# Patient Record
Sex: MALE | Race: BLACK OR AFRICAN AMERICAN | NOT HISPANIC OR LATINO | Employment: UNEMPLOYED | URBAN - METROPOLITAN AREA
[De-identification: names, ages, dates, MRNs, and addresses within clinical notes are randomized per-mention and may not be internally consistent; named-entity substitution may affect disease eponyms.]

---

## 2017-12-31 ENCOUNTER — HOSPITAL ENCOUNTER (EMERGENCY)
Facility: HOSPITAL | Age: 32
Discharge: HOME | End: 2017-12-31
Attending: EMERGENCY MEDICINE
Payer: MEDICAID

## 2017-12-31 VITALS
DIASTOLIC BLOOD PRESSURE: 61 MMHG | HEIGHT: 72 IN | BODY MASS INDEX: 23.03 KG/M2 | WEIGHT: 170 LBS | OXYGEN SATURATION: 100 % | RESPIRATION RATE: 16 BRPM | TEMPERATURE: 99 F | SYSTOLIC BLOOD PRESSURE: 119 MMHG | HEART RATE: 78 BPM

## 2017-12-31 DIAGNOSIS — L02.214 CUTANEOUS ABSCESS OF GROIN: Primary | ICD-10-CM

## 2017-12-31 PROCEDURE — 10060 I&D ABSCESS SIMPLE/SINGLE: CPT

## 2017-12-31 PROCEDURE — 25000003 PHARM REV CODE 250: Performed by: PHYSICIAN ASSISTANT

## 2017-12-31 PROCEDURE — 99283 EMERGENCY DEPT VISIT LOW MDM: CPT | Mod: 25

## 2017-12-31 RX ORDER — SULFAMETHOXAZOLE AND TRIMETHOPRIM 800; 160 MG/1; MG/1
2 TABLET ORAL
Status: COMPLETED | OUTPATIENT
Start: 2017-12-31 | End: 2017-12-31

## 2017-12-31 RX ORDER — IBUPROFEN 600 MG/1
600 TABLET ORAL
Status: COMPLETED | OUTPATIENT
Start: 2017-12-31 | End: 2017-12-31

## 2017-12-31 RX ORDER — LIDOCAINE HYDROCHLORIDE AND EPINEPHRINE 20; 10 MG/ML; UG/ML
10 INJECTION, SOLUTION INFILTRATION; PERINEURAL
Status: COMPLETED | OUTPATIENT
Start: 2017-12-31 | End: 2017-12-31

## 2017-12-31 RX ORDER — SULFAMETHOXAZOLE AND TRIMETHOPRIM 800; 160 MG/1; MG/1
1 TABLET ORAL 2 TIMES DAILY
Qty: 14 TABLET | Refills: 0 | Status: SHIPPED | OUTPATIENT
Start: 2017-12-31 | End: 2018-01-07

## 2017-12-31 RX ORDER — IBUPROFEN 600 MG/1
600 TABLET ORAL EVERY 6 HOURS PRN
Qty: 20 TABLET | Refills: 0 | Status: SHIPPED | OUTPATIENT
Start: 2017-12-31 | End: 2018-03-14

## 2017-12-31 RX ADMIN — IBUPROFEN 600 MG: 600 TABLET, FILM COATED ORAL at 02:12

## 2017-12-31 RX ADMIN — SULFAMETHOXAZOLE AND TRIMETHOPRIM 2 TABLET: 800; 160 TABLET ORAL at 02:12

## 2017-12-31 RX ADMIN — LIDOCAINE HYDROCHLORIDE,EPINEPHRINE BITARTRATE 10 ML: 20; .01 INJECTION, SOLUTION INFILTRATION; PERINEURAL at 02:12

## 2017-12-31 NOTE — ED PROVIDER NOTES
Encounter Date: 12/31/2017    SCRIBE #1 NOTE: I, Sarika Mac, am scribing for, and in the presence of,  Marni Rockwell PA-C. I have scribed the following portions of the note - Other sections scribed: HPI/ROS.       History     Chief Complaint   Patient presents with    Abscess     Right groin x3 days      CC: Abscess    HPI: 32 y.o. M with H/O of an abscess presents to the ED c/o a worsening, severely painful abscess to the R groin area x3 days. Pt reports the pain radiates to his testicle and inner thigh. Pain is exacerbated with palpation. No prior tx attempted. Pt denies fever, chills, N/V/D, abdominal pain, and any other symptoms.          The history is provided by the patient.     Review of patient's allergies indicates:  No Known Allergies  History reviewed. No pertinent past medical history.  Past Surgical History:   Procedure Laterality Date    LEG SURGERY       History reviewed. No pertinent family history.  Social History   Substance Use Topics    Smoking status: Never Smoker    Smokeless tobacco: Never Used    Alcohol use No     Review of Systems   Constitutional: Negative for chills and fever.   HENT: Negative for congestion and sore throat.    Eyes: Negative for pain.   Respiratory: Negative for cough and shortness of breath.    Cardiovascular: Negative for chest pain.   Gastrointestinal: Negative for abdominal pain, diarrhea, nausea and vomiting.   Genitourinary: Positive for testicular pain. Negative for dysuria, penile pain, penile swelling and scrotal swelling.   Musculoskeletal: Negative for back pain and neck pain.   Skin: Negative for rash.        (+) abscess to R groin area; pain radiating down to testicle and inner thigh   Neurological: Negative for headaches.       Physical Exam     Initial Vitals [12/31/17 1335]   BP Pulse Resp Temp SpO2   (!) 125/59 89 14 98.9 °F (37.2 °C) 100 %      MAP       81         Physical Exam    Vitals reviewed.  Constitutional: He appears well-developed and  well-nourished. He is not diaphoretic. No distress.   HENT:   Head: Normocephalic and atraumatic.   Right Ear: External ear normal.   Left Ear: External ear normal.   Nose: Nose normal.   Eyes: Conjunctivae are normal. No scleral icterus.   Neck: Normal range of motion. Neck supple.   Cardiovascular: Normal rate, regular rhythm, normal heart sounds and intact distal pulses.   Pulmonary/Chest: Breath sounds normal. No respiratory distress. He has no wheezes. He has no rhonchi. He has no rales. He exhibits no tenderness.   Abdominal: Soft. He exhibits no distension and no mass. There is no tenderness. There is no rebound and no guarding.   Musculoskeletal: Normal range of motion.   Neurological: He is alert and oriented to person, place, and time.   Skin: Skin is warm and dry. Abscess noted. No rash noted. No erythema.   2 cm area of raised tender fluctuance to the right groin with surrounding erythema.  No scrotal or testicle erythema or tenderness.         ED Course   I & D - Incision and Drainage  Date/Time: 12/31/2017 3:15 PM  Performed by: ZAIN NORRIS  Authorized by: STACI JC   Type: abscess  Body area: anogenital (Right groin/inguinal)  Anesthesia: local infiltration    Anesthesia:  Local Anesthetic: lidocaine 2% with epinephrine  Anesthetic total: 2 mL  Scalpel size: 11  Incision type: single straight  Complexity: simple  Drainage: purulent and  viscous  Drainage amount: moderate  Wound treatment: incision,  wound left open,  drainage,  deloculation,  expression of material and  wound packed  Packing material: 1/4 in gauze  Complications: No  Specimens: No  Implants: No  Patient tolerance: Patient tolerated the procedure well with no immediate complications        Labs Reviewed - No data to display          Medical Decision Making:   Initial Assessment:   32-year-old male with no comorbidities presents with 2 day history of abscess to right groin.  Denies fever, testicle pain, abdominal  pain.  Differential Diagnosis:   Cellulitis, abscess, necrotizing fasciitis, Shaheen's gangrene  ED Management:  Patient presents well-appearing in no distress.  Abscess to right groin without evidence of extension to scrotum or testicle.  Abdomen soft and nontender.  I&D performed with successful drainage.  Mild amount of surrounding cellulitis.  Patient treated with Bactrim and ibuprofen.  He was given wound care instructions and return precautions.  He verbalized understanding and agreed with plan.            Scribe Attestation:   Scribe #1: I performed the above scribed service and the documentation accurately describes the services I performed. I attest to the accuracy of the note.    Attending Attestation:           Physician Attestation for Scribe:  Physician Attestation Statement for Scribe #1: I, Christian Rockwell PA-C, reviewed documentation, as scribed by Sariak Mac in my presence, and it is both accurate and complete.                 ED Course      Clinical Impression:   The encounter diagnosis was Cutaneous abscess of groin.                           Christian Rockwell PA-C  12/31/17 1515     I have reviewed and confirmed nurses' notes for patient's medications, allergies, medical history, and surgical history.

## 2017-12-31 NOTE — DISCHARGE INSTRUCTIONS
Remove packing in 2 days.  Return to the ER if infection returns or urine fever, the area gets more swollen, painful, red, or worsening discharge.  Take antibiotics, Bactrim, until complete

## 2018-01-16 ENCOUNTER — HOSPITAL ENCOUNTER (EMERGENCY)
Facility: HOSPITAL | Age: 33
Discharge: LEFT BEFORE DISCHARGE | End: 2018-01-16
Attending: EMERGENCY MEDICINE
Payer: MEDICAID

## 2018-01-16 VITALS
HEART RATE: 62 BPM | DIASTOLIC BLOOD PRESSURE: 73 MMHG | TEMPERATURE: 98 F | HEIGHT: 72 IN | OXYGEN SATURATION: 98 % | WEIGHT: 160 LBS | SYSTOLIC BLOOD PRESSURE: 129 MMHG | BODY MASS INDEX: 21.67 KG/M2 | RESPIRATION RATE: 16 BRPM

## 2018-01-16 DIAGNOSIS — R07.9 CHEST PAIN: ICD-10-CM

## 2018-01-16 DIAGNOSIS — J06.9 VIRAL URI: Primary | ICD-10-CM

## 2018-01-16 DIAGNOSIS — R07.89 MIDSTERNAL CHEST PAIN: ICD-10-CM

## 2018-01-16 LAB
BACTERIA #/AREA URNS HPF: NORMAL /HPF
BILIRUB UR QL STRIP: NEGATIVE
CLARITY UR: CLEAR
COLOR UR: YELLOW
FLUAV AG SPEC QL IA: NEGATIVE
FLUBV AG SPEC QL IA: NEGATIVE
GLUCOSE UR QL STRIP: NEGATIVE
HGB UR QL STRIP: NEGATIVE
HYALINE CASTS #/AREA URNS LPF: 1 /LPF
KETONES UR QL STRIP: ABNORMAL
LEUKOCYTE ESTERASE UR QL STRIP: NEGATIVE
MICROSCOPIC COMMENT: NORMAL
NITRITE UR QL STRIP: NEGATIVE
PH UR STRIP: 5 [PH] (ref 5–8)
PROT UR QL STRIP: ABNORMAL
RBC #/AREA URNS HPF: 1 /HPF (ref 0–4)
SP GR UR STRIP: >1.03 (ref 1–1.03)
SPECIMEN SOURCE: NORMAL
URN SPEC COLLECT METH UR: ABNORMAL
UROBILINOGEN UR STRIP-ACNC: ABNORMAL EU/DL
WBC #/AREA URNS HPF: 1 /HPF (ref 0–5)

## 2018-01-16 PROCEDURE — 63600175 PHARM REV CODE 636 W HCPCS: Performed by: NURSE PRACTITIONER

## 2018-01-16 PROCEDURE — 87400 INFLUENZA A/B EACH AG IA: CPT | Mod: 59

## 2018-01-16 PROCEDURE — 93005 ELECTROCARDIOGRAM TRACING: CPT

## 2018-01-16 PROCEDURE — 93010 ELECTROCARDIOGRAM REPORT: CPT | Mod: ,,, | Performed by: INTERNAL MEDICINE

## 2018-01-16 PROCEDURE — 81000 URINALYSIS NONAUTO W/SCOPE: CPT

## 2018-01-16 PROCEDURE — 25000003 PHARM REV CODE 250: Performed by: NURSE PRACTITIONER

## 2018-01-16 PROCEDURE — 96372 THER/PROPH/DIAG INJ SC/IM: CPT

## 2018-01-16 PROCEDURE — 99284 EMERGENCY DEPT VISIT MOD MDM: CPT | Mod: 25

## 2018-01-16 RX ORDER — BENZONATATE 100 MG/1
100 CAPSULE ORAL 3 TIMES DAILY PRN
Qty: 10 CAPSULE | Refills: 0 | Status: SHIPPED | OUTPATIENT
Start: 2018-01-16 | End: 2018-01-26

## 2018-01-16 RX ORDER — KETOROLAC TROMETHAMINE 30 MG/ML
10 INJECTION, SOLUTION INTRAMUSCULAR; INTRAVENOUS
Status: COMPLETED | OUTPATIENT
Start: 2018-01-16 | End: 2018-01-16

## 2018-01-16 RX ORDER — ONDANSETRON 4 MG/1
4 TABLET, ORALLY DISINTEGRATING ORAL
Status: COMPLETED | OUTPATIENT
Start: 2018-01-16 | End: 2018-01-16

## 2018-01-16 RX ADMIN — ONDANSETRON 4 MG: 4 TABLET, ORALLY DISINTEGRATING ORAL at 12:01

## 2018-01-16 RX ADMIN — KETOROLAC TROMETHAMINE 10 MG: 30 INJECTION, SOLUTION INTRAMUSCULAR at 12:01

## 2018-01-16 NOTE — ED TRIAGE NOTES
Reports taking an UNK abx this AM for sore throat. Here today with c/o HA, body ache, sore throat, weakness.  Wife has flu. No OTC meds taken today.

## 2018-01-16 NOTE — ED PROVIDER NOTES
Encounter Date: 1/16/2018       History     Chief Complaint   Patient presents with    Generalized Body Aches     Body aches with chills.  Reports wife has flu.     CC: Generalized body aches    HPI: Patient is a 32-year-old male who presents today with chief complaint of generalized body aches for 2 days.  He also reports subjective fever, chills, headache, nasal congestion, sinus pressure, mild sore throat, nonproductive cough.  He also reports midsternal chest discomfort that began after waking up on stretching.  Pain did not radiate.  He reports the pain lasted about 2 hours.  He is no longer experiencing the chest pain.  He denies wheezing, shortness of breath, abdominal pain, nausea, vomiting, diarrhea, dysuria, increased urinary urgency or frequency.  He took some leftover antibiotics he had at home today; he is unsure of the name.  No other medications taken today.  No known medical history.  No known ALLERGIES.      The history is provided by the patient. No  was used.     Review of patient's allergies indicates:  No Known Allergies  History reviewed. No pertinent past medical history.  Past Surgical History:   Procedure Laterality Date    LEG SURGERY       History reviewed. No pertinent family history.  Social History   Substance Use Topics    Smoking status: Never Smoker    Smokeless tobacco: Never Used    Alcohol use No     Review of Systems   Constitutional: Positive for chills and fever.   HENT: Positive for congestion, sinus pressure and sore throat. Negative for ear discharge, ear pain, rhinorrhea, trouble swallowing and voice change.    Eyes: Negative for photophobia, pain, discharge, redness and itching.   Respiratory: Positive for cough. Negative for shortness of breath.    Cardiovascular: Negative for chest pain.   Gastrointestinal: Positive for nausea. Negative for abdominal pain, diarrhea and vomiting.   Genitourinary: Negative for decreased urine volume, difficulty  urinating, dysuria, flank pain, frequency and urgency.   Musculoskeletal: Negative for back pain, neck pain and neck stiffness.   Skin: Negative for rash.   Neurological: Positive for headaches. Negative for dizziness, weakness and light-headedness.   Hematological: Does not bruise/bleed easily.   Psychiatric/Behavioral: Negative for confusion.       Physical Exam     Initial Vitals [01/16/18 1101]   BP Pulse Resp Temp SpO2   120/79 68 16 98.4 °F (36.9 °C) 99 %      MAP       92.67         Physical Exam    Vitals reviewed.  Constitutional: Vital signs are normal. He appears well-developed and well-nourished. He is not diaphoretic.  Non-toxic appearance. He does not have a sickly appearance. He does not appear ill. No distress.   HENT:   Head: Normocephalic and atraumatic.   Right Ear: Hearing, tympanic membrane, external ear and ear canal normal. Tympanic membrane is not erythematous and not bulging. No middle ear effusion.   Left Ear: Hearing, tympanic membrane, external ear and ear canal normal. Tympanic membrane is not erythematous and not bulging.  No middle ear effusion.   Nose: Mucosal edema and rhinorrhea present. Right sinus exhibits no maxillary sinus tenderness and no frontal sinus tenderness. Left sinus exhibits no maxillary sinus tenderness and no frontal sinus tenderness.   Mouth/Throat: Uvula is midline, oropharynx is clear and moist and mucous membranes are normal. No trismus in the jaw. No uvula swelling. No oropharyngeal exudate, posterior oropharyngeal edema, posterior oropharyngeal erythema or tonsillar abscesses.   Eyes: Conjunctivae and EOM are normal. Pupils are equal, round, and reactive to light. Right eye exhibits no discharge. Left eye exhibits no discharge.   Neck: Normal range of motion and full passive range of motion without pain. Neck supple. No thyroid mass and no thyromegaly present. No spinous process tenderness and no muscular tenderness present. No tracheal deviation, no edema, no  erythema and normal range of motion present. No neck rigidity. No JVD present.   Cardiovascular: Normal rate, regular rhythm, normal heart sounds and intact distal pulses. Exam reveals no gallop and no friction rub.    No murmur heard.  Pulmonary/Chest: Breath sounds normal. No accessory muscle usage or stridor. No respiratory distress. He has no decreased breath sounds. He has no wheezes. He has no rhonchi. He has no rales. He exhibits no tenderness.   Abdominal: Soft. Bowel sounds are normal. He exhibits no distension and no mass. There is no hepatosplenomegaly. There is no tenderness. There is no rigidity, no rebound, no guarding, no CVA tenderness, no tenderness at McBurney's point and negative Rowland's sign.   Musculoskeletal: Normal range of motion.   Lymphadenopathy:     He has no cervical adenopathy.   Neurological: He is alert and oriented to person, place, and time. GCS eye subscore is 4. GCS verbal subscore is 5. GCS motor subscore is 6.   Skin: Skin is warm, dry and intact. No rash noted. No erythema.   Psychiatric: He has a normal mood and affect. Thought content normal.         ED Course   Procedures  Labs Reviewed   INFLUENZA A AND B ANTIGEN   URINALYSIS     EKG Readings: (Independently Interpreted)   Initial Reading: No STEMI. Rhythm: Normal Sinus Rhythm. Heart Rate: 61.   Normal sinus rhythm with sinus arrhythmia.  Biatrial enlargement.  ST elevation, consider early repolarization, pericarditis, or injury.  Nonspecific ST abnormality.  This EKG was reviewed by Dr. Schilling as well as Dr. Bermudez who believes this is an early repolarization pattern.          Medical Decision Making:   ED Management:  This is an evaluation of a 32 y.o. male that presents to the Emergency Department for body aches, cough, rhinorrhea and nasal congestion for 2 days.  He also reports that 2 hours of dull precordial chest pressure prior to arrival upon wakening.  Pain is now gone.  The patient is a non-toxic, afebrile, and  "well appearing male. On physical exam ears and pharynx are without evidence of infection. Appears well hydrated with moist mucus membranes. Neck soft and supple with no meningeal signs or cervical lymphadenopathy. Breath sounds are clear and equal bilaterally with no adventitious breath sounds, tachypnea or respiratory distress with room air pulse ox of 98% and no evidence of hypoxia. On examination the abdomen is flat without distention.  There is no surface trauma, scars, incisions.  Bowel sounds are present in all 4 quadrants.  No tenderness, guarding, rigidity to palpation.  No tenderness, masses, pulsation in epigastric area.  No organomegaly.  Negative Rowland sign.  No periumbilical tenderness.  No rebound in the lower quadrants.  Nontender over McBurney's point.  No suprapubic tenderness or distention.  Good femoral pulses bilaterally.  No CVA tenderness.    Vital Signs Are Reassuring.  RESULTS:   Influenza antigen negative.  Chest x-ray with no acute cardiopulmonary process seen.  UA is negative for infection.  EKG with early repolarization pattern.    My overall impression is Viral URI. I considered, but at this time, do not suspect OM, OE, strep pharyngitis, meningitis, pneumonia, or acute bacterial sinusitis.    Patient was given Toradol with good relief body aches.  Patient is PERC Negative.  The Chest Xray has been interpreted by myself and  -negative for infiltrates, pneumothorax, cardiomegaly, pleural effusion or widening of the mediastinum. The EKG interpreted by myself and -.  I do not suspect this patient is having an emergent etiology of chest pain.The patient refused further cardiac workup workup including IV access establishment, CBC, CMP, and troponin. Importance of these tests were discussed with the patient by myself as well as by Dr. Schilling. Patient continued to refuse further testing and decided he wanted to leave [place]. He states he "feels fine and there is nothing wrong " "with me". He was educated on possible risks of refusal of treatment.  He states he will follow up with [facility].  Paperwork signed by patient.  ED return precautions given.    ED Course: Zofran, Toradol. D/C Meds: Tessalon perles. Additional D/C Information: Throat lozenges and warm salt water gargles for sore throat.  Take Tylenol and ibuprofen for fever and pain.  Chest pain precautions.The diagnosis, treatment plan, instructions for follow-up and reevaluation with PCP and cardiology as well as ED return precautions were discussed and understanding was verbalized. All questions or concerns have been addressed.     This case was discussed with and the patient has been examined by Dr. Schilling who is in agreement with my assessment and plan.     Additional MDM:   PERC Rule:   Age is greater than or equal to 50 = 0.0  Heart Rate is greater than or equal to 100 = 0.0  SaO2 on room air < 95% = 0.0  Unilateral leg swelling = 0.0  Hemoptysis = 0.0  Recent surgery or trauma = 0.0  Prior PE or DVT =  0.0  Hormone use = 0.00  PERC Score = 0           Attending Attestation:     Physician Attestation Statement for NP/PA:   I have conducted a face to face encounter with this patient in addition to the NP/PA, due to    Other NP/PA Attestation Additions:    History of Present Illness: The patient complains of several hours of dull precordial chest pressure.  Pain is gone now.  There is no history of hypertension diabetes hyperlipidemia cigarette smoking family history of heart disease   Physical Exam: The lungs are clear.  The heart tones are normal.  The abdomen is soft.   Medical Decision Making: EKG reveals an early repolarization pattern.  I reviewed the EKG with Dr. Bermudez,  cardiology, who agrees.  I'll discharge this patient to outpatient evaluation and treatment if his cardiac markers are negative.                 ED Course      Clinical Impression:   The primary encounter diagnosis was Viral URI. Diagnoses of Chest pain " and Midsternal chest pain were also pertinent to this visit.    Disposition:   Disposition: AMA  Condition: Stable                        Nadja Bowers NP  01/16/18 2159       Jesus Schilling MD  01/18/18 2122

## 2018-01-16 NOTE — DISCHARGE INSTRUCTIONS
Alternate over the counter ibuprofen and tylenol for fever/pain. Cepacol lozenges, warm fluids to drink, and warm salt water gargles for sore throat.     Please return to the Emergency Department for any new or worsening symptoms including: fever, chest pain, shortness of breath, loss of consciousness, dizziness, weakness, or any other concerns.     Please follow up with your Primary Care Provider within in the week. If you do not have one, you may contact the one listed on your discharge paperwork or you may also call the [facility] Appointment Desk at [phone] to schedule an appointment with one.     Please take all medication as prescribed.

## 2018-03-06 ENCOUNTER — OFFICE VISIT (OUTPATIENT)
Dept: URGENT CARE | Facility: CLINIC | Age: 33
End: 2018-03-06
Payer: MEDICAID

## 2018-03-06 VITALS
OXYGEN SATURATION: 98 % | BODY MASS INDEX: 21.67 KG/M2 | SYSTOLIC BLOOD PRESSURE: 129 MMHG | RESPIRATION RATE: 16 BRPM | HEIGHT: 72 IN | WEIGHT: 160 LBS | DIASTOLIC BLOOD PRESSURE: 76 MMHG | HEART RATE: 67 BPM | TEMPERATURE: 97 F

## 2018-03-06 DIAGNOSIS — J02.9 SORE THROAT: ICD-10-CM

## 2018-03-06 DIAGNOSIS — J06.9 UPPER RESPIRATORY TRACT INFECTION, UNSPECIFIED TYPE: Primary | ICD-10-CM

## 2018-03-06 LAB
CTP QC/QA: YES
S PYO RRNA THROAT QL PROBE: NEGATIVE

## 2018-03-06 PROCEDURE — 99213 OFFICE O/P EST LOW 20 MIN: CPT | Mod: 25,S$GLB,, | Performed by: NURSE PRACTITIONER

## 2018-03-06 PROCEDURE — 87880 STREP A ASSAY W/OPTIC: CPT | Mod: QW,S$GLB,, | Performed by: NURSE PRACTITIONER

## 2018-03-06 RX ORDER — BETAMETHASONE SODIUM PHOSPHATE AND BETAMETHASONE ACETATE 3; 3 MG/ML; MG/ML
9 INJECTION, SUSPENSION INTRA-ARTICULAR; INTRALESIONAL; INTRAMUSCULAR; SOFT TISSUE
Status: COMPLETED | OUTPATIENT
Start: 2018-03-06 | End: 2018-03-06

## 2018-03-06 RX ADMIN — BETAMETHASONE SODIUM PHOSPHATE AND BETAMETHASONE ACETATE 9 MG: 3; 3 INJECTION, SUSPENSION INTRA-ARTICULAR; INTRALESIONAL; INTRAMUSCULAR; SOFT TISSUE at 10:03

## 2018-03-06 NOTE — LETTER
March 6, 2018      [facility] [place]  [place] [address]  Phone: [phone]  Fax: [phone]       Patient: Nelly Short   YOB: 1985  Date of Visit: 03/06/2018    To Whom It May Concern:    Samra Short  was at [facility] on 03/06/2018. She may return to work/school on  with no restrictions. If you have any questions or concerns, or if I can be of further assistance, please do not hesitate to contact me.    Sincerely,    Laura Alvarez NP

## 2018-03-06 NOTE — LETTER
March 6, 2018      [facility]  [place]  [place] [address]  Phone: [phone]  Fax: [phone]       Patient: Nelly Short   YOB: 1985  Date of Visit: 03/06/2018    To Whom It May Concern:    Samra Short  was at [facility] on 03/06/2018. He may return to work/school on 3/8/18 with no restrictions. If you have any questions or concerns, or if I can be of further assistance, please do not hesitate to contact me.    Sincerely,    Laura Alvarez NP

## 2018-03-06 NOTE — PROGRESS NOTES
Subjective:       Patient ID: Nelly Short is a 32 y.o. male.    Vitals:  height is 6' (1.829 m) and weight is 72.6 kg (160 lb). His tympanic temperature is 97.4 °F (36.3 °C). His blood pressure is 129/76 and his pulse is 67. His respiration is 16 and oxygen saturation is 98%.     Chief Complaint: Sinus Problem    Patient stated his symptoms started this morning. Patient states he is having sinus issues, sore throat, and cough. Patient also states he would like an EKG done because he has an irregular heart beat.       Sinus Problem   This is a new problem. The current episode started today. The problem is unchanged. There has been no fever. His pain is at a severity of 8/10. The pain is moderate. Associated symptoms include congestion, coughing, ear pain, headaches, sinus pressure, sneezing and a sore throat. Pertinent negatives include no chills, hoarse voice or shortness of breath. (Runny nose) Past treatments include nothing.     Review of Systems   Constitution: Positive for malaise/fatigue. Negative for chills and fever.   HENT: Positive for congestion, ear pain, sinus pressure, sneezing and sore throat. Negative for hoarse voice.    Eyes: Negative for discharge and redness.   Cardiovascular: Negative for chest pain, dyspnea on exertion and leg swelling.   Respiratory: Positive for cough and sputum production. Negative for shortness of breath and wheezing.    Musculoskeletal: Negative for myalgias.   Gastrointestinal: Negative for abdominal pain and nausea.   Neurological: Positive for headaches.       Objective:      Physical Exam   Constitutional: He is oriented to person, place, and time. He appears well-developed and well-nourished.   HENT:   Head: Normocephalic and atraumatic.   Right Ear: Hearing, tympanic membrane, external ear and ear canal normal. Tympanic membrane is not erythematous.   Left Ear: Hearing, tympanic membrane, external ear and ear canal normal. Tympanic membrane is not erythematous.    Nose: Mucosal edema and rhinorrhea present. Right sinus exhibits no maxillary sinus tenderness and no frontal sinus tenderness. Left sinus exhibits no maxillary sinus tenderness and no frontal sinus tenderness.   Mouth/Throat: Uvula is midline and mucous membranes are normal. Posterior oropharyngeal erythema (mild) present. No posterior oropharyngeal edema.   Eyes: Conjunctivae, EOM and lids are normal. Right conjunctiva is not injected. Left conjunctiva is not injected.   Neck: Normal range of motion and full passive range of motion without pain. Neck supple.   Cardiovascular: Normal rate, regular rhythm, S1 normal, S2 normal, normal heart sounds and normal pulses.    Pulmonary/Chest: Effort normal and breath sounds normal. No respiratory distress. He has no decreased breath sounds. He has no wheezes.   Neurological: He is alert and oriented to person, place, and time.   Skin: Skin is warm and dry.   Nursing note and vitals reviewed.      Assessment:       1. Upper respiratory tract infection, unspecified type    2. Sore throat        Plan:         Upper respiratory tract infection, unspecified type  -     Ambulatory referral to Internal Medicine    Sore throat  -     POCT rapid strep A    Other orders  -     betamethasone acetate-betamethasone sodium phosphate injection 9 mg; Inject 1.5 mLs (9 mg total) into the muscle one time.

## 2018-03-06 NOTE — PATIENT INSTRUCTIONS
Take over the counter Claritin, Allegra, or Zyrtec for relief of symptoms.   Take over the counter Flonase for relief of symptoms.  These medications can be purchased at any local drug store or pharmacy.    Please arrange follow up with your primary medical clinic as soon as possible. You must understand that you've received an Urgent Care treatment only and that you may be released before all of your medical problems are known or treated. You, the patient, will arrange for follow up as instructed. If your symptoms worsen or fail to improve you should go to the Emergency Room.      Viral Upper Respiratory Illness (Adult)  You have a viral upper respiratory illness (URI), which is another term for the common cold. This illness is contagious during the first few days. It is spread through the air by coughing and sneezing. It may also be spread by direct contact (touching the sick person and then touching your own eyes, nose, or mouth). Frequent handwashing will decrease risk of spread. Most viral illnesses go away within 7 to 10 days with rest and simple home remedies. Sometimes the illness may last for several weeks. Antibiotics will not kill a virus, and they are generally not prescribed for this condition.    Home care  · If symptoms are severe, rest at home for the first 2 to 3 days. When you resume activity, don't let yourself get too tired.  · Avoid being exposed to cigarette smoke (yours or others).  · You may use acetaminophen or ibuprofen to control pain and fever, unless another medicine was prescribed. (Note: If you have chronic liver or kidney disease, have ever had a stomach ulcer or gastrointestinal bleeding, or are taking blood-thinning medicines, talk with your healthcare provider before using these medicines.) Aspirin should never be given to anyone under 18 years of age who is ill with a viral infection or fever. It may cause severe liver or brain damage.  · Your appetite may be poor, so a light  diet is fine. Avoid dehydration by drinking 6 to 8 glasses of fluids per day (water, soft drinks, juices, tea, or soup). Extra fluids will help loosen secretions in the nose and lungs.  · Over-the-counter cold medicines will not shorten the length of time youre sick, but they may be helpful for the following symptoms: cough, sore throat, and nasal and sinus congestion. (Note: Do not use decongestants if you have high blood pressure.)  Follow-up care  Follow up with your healthcare provider, or as advised.  When to seek medical advice  Call your healthcare provider right away if any of these occur:  · Cough with lots of colored sputum (mucus)  · Severe headache; face, neck, or ear pain  · Difficulty swallowing due to throat pain  · Fever of 100.4°F (38°C)  Call 911, or get immediate medical care  Call emergency services right away if any of these occur:  · Chest pain, shortness of breath, wheezing, or difficulty breathing  · Coughing up blood  · Inability to swallow due to throat pain  Date Last Reviewed: 9/13/2015 © 2000-2017 [employer]. [address] [place] [address]. All rights reserved. This information is not intended as a substitute for professional medical care. Always follow your healthcare professional's instructions.

## 2018-03-14 ENCOUNTER — CLINICAL SUPPORT (OUTPATIENT)
Dept: LAB | Facility: HOSPITAL | Age: 33
End: 2018-03-14
Attending: FAMILY MEDICINE
Payer: MEDICAID

## 2018-03-14 ENCOUNTER — OFFICE VISIT (OUTPATIENT)
Dept: FAMILY MEDICINE | Facility: HOSPITAL | Age: 33
End: 2018-03-14
Attending: FAMILY MEDICINE
Payer: MEDICAID

## 2018-03-14 VITALS
BODY MASS INDEX: 21.56 KG/M2 | HEIGHT: 73 IN | HEART RATE: 66 BPM | DIASTOLIC BLOOD PRESSURE: 65 MMHG | SYSTOLIC BLOOD PRESSURE: 105 MMHG | WEIGHT: 162.69 LBS | TEMPERATURE: 99 F

## 2018-03-14 DIAGNOSIS — L81.8 EXTENSIVE TATTOOS: ICD-10-CM

## 2018-03-14 DIAGNOSIS — Z76.89 ENCOUNTER TO ESTABLISH CARE: ICD-10-CM

## 2018-03-14 DIAGNOSIS — R94.31 ABNORMAL EKG: ICD-10-CM

## 2018-03-14 DIAGNOSIS — J06.9 VIRAL URI: Primary | ICD-10-CM

## 2018-03-14 DIAGNOSIS — J32.9 SINUSITIS, UNSPECIFIED CHRONICITY, UNSPECIFIED LOCATION: ICD-10-CM

## 2018-03-14 DIAGNOSIS — Z11.3 SCREENING FOR STD (SEXUALLY TRANSMITTED DISEASE): ICD-10-CM

## 2018-03-14 PROCEDURE — 99213 OFFICE O/P EST LOW 20 MIN: CPT | Mod: 25 | Performed by: FAMILY MEDICINE

## 2018-03-14 PROCEDURE — 93010 ELECTROCARDIOGRAM REPORT: CPT | Mod: ,,, | Performed by: INTERNAL MEDICINE

## 2018-03-14 PROCEDURE — 93005 ELECTROCARDIOGRAM TRACING: CPT

## 2018-03-14 PROCEDURE — 93010 EKG 12-LEAD: ICD-10-PCS | Mod: ,,, | Performed by: INTERNAL MEDICINE

## 2018-03-14 RX ORDER — AZITHROMYCIN 250 MG/1
TABLET, FILM COATED ORAL
Qty: 6 TABLET | Refills: 0 | Status: SHIPPED | OUTPATIENT
Start: 2018-03-14 | End: 2018-03-19

## 2018-03-14 NOTE — PROGRESS NOTES
Subjective:       Patient ID: Nelly Short is a 32 y.o. male.    Chief Complaint: Check-up    32 year old male who presents to [facility] care. Patient reports a URI that started a week ago. Started with a sore throat, progress to cough, runny nose. Hasn't been taking the ibuprofen or OTC meds. Not getting better but not getting worse. Has to continually blow his nose. Was seen by the doctor and received a steroid shot and ibuprofen last Tuesday. Hasn't been taking ibuprofen. States he has been sick about three times this year. Four kids at home (4yo, 8yo, 12yo, 17yo), 4yo is in .   Not on any meds at home, no PMH, NKDA.  Broke femur a few years ago, had a carol placed.   Works as a , doesn't have SOB/CP with exertion. Doesn't get chest pain with walking up stairs.   Cigs 1-2 every once in a while, occasional social drinker.   Sexually active, not using condoms, partner has tubes tied.   No family history of DM or HTN.       Review of Systems   Constitutional: Negative for chills and fever.   HENT: Negative for congestion and sore throat.    Eyes: Negative for pain and discharge.   Respiratory: Negative for cough and shortness of breath.    Cardiovascular: Negative for chest pain.   Gastrointestinal: Negative for abdominal pain, diarrhea, nausea and vomiting.   Genitourinary: Negative for difficulty urinating and dysuria.   Musculoskeletal: Negative for back pain and neck pain.   Skin: Negative for rash.   Neurological: Negative for light-headedness and headaches.   Hematological: Does not bruise/bleed easily.   Psychiatric/Behavioral: Negative for agitation and behavioral problems.       Objective:      Vitals:    03/14/18 1446   BP: 105/65   Pulse: 66   Temp: 99.1 °F (37.3 °C)     Physical Exam   Constitutional: He is oriented to person, place, and time. He appears well-developed and well-nourished. No distress.   HENT:   Head: Normocephalic and atraumatic.   Right Ear: External ear normal.    Left Ear: External ear normal.   Mouth/Throat: No oropharyngeal exudate.   Eyes: Conjunctivae and EOM are normal. Right eye exhibits no discharge. Left eye exhibits no discharge.   Neck: Normal range of motion. Neck supple.   Cardiovascular: Normal rate, regular rhythm and normal heart sounds.  Exam reveals no gallop and no friction rub.    No murmur heard.  Pulmonary/Chest: Effort normal and breath sounds normal. No respiratory distress.   Abdominal: Soft. He exhibits no distension. There is no tenderness.   Musculoskeletal: He exhibits no edema or deformity.   Neurological: He is alert and oriented to person, place, and time.   Skin: Skin is warm and dry. He is not diaphoretic.   Psychiatric: He has a normal mood and affect. His behavior is normal.   Nursing note and vitals reviewed.      Assessment:       1. Viral URI    2. Encounter to establish care    3. Abnormal EKG    4. Extensive tattoos    5. Screening for STD (sexually transmitted disease)    6. Sinusitis, unspecified chronicity, unspecified location        Plan:       Viral URI    Encounter to establish care    Abnormal EKG  -     SCHEDULED EKG 12-LEAD (to Muse); Future    Extensive tattoos  -     Hepatitis panel, acute; Future; Expected date: 03/14/2018    Screening for STD (sexually transmitted disease)  -     RPR; Future; Expected date: 03/14/2018  -     C. trachomatis/N. gonorrhoeae by AMP DNA Urine; Future; Expected date: 03/14/2018  -     HIV 1 / 2 ANTIBODY; Future; Expected date: 03/14/2018  -     Hepatitis panel, acute; Future; Expected date: 03/14/2018    Sinusitis, unspecified chronicity, unspecified location  -     azithromycin (Z-JENNY) 250 MG tablet; Take 2 tablets by mouth on day 1; Take 1 tablet by mouth on days 2-5  Dispense: 6 tablet; Refill: 0      Follow-up in about 2 weeks (around 3/28/2018).

## 2018-03-15 NOTE — PROGRESS NOTES
I assume primary medical responsibility for this patient, I have reviewed the case history, findings, diagnosis and treatment plan with the resident and agree that the care is reasonable and necessary. This service has been performed by a resident without the presence of a teaching physician under the primary care exception  Adia Khan  3/15/2018

## 2018-12-10 ENCOUNTER — OFFICE VISIT (OUTPATIENT)
Dept: OPTOMETRY | Facility: CLINIC | Age: 33
End: 2018-12-10
Payer: MEDICAID

## 2018-12-10 DIAGNOSIS — H43.391 VITREOUS FLOATERS, RIGHT: Primary | ICD-10-CM

## 2018-12-10 PROCEDURE — 92004 COMPRE OPH EXAM NEW PT 1/>: CPT | Mod: S$PBB,,, | Performed by: OPTOMETRIST

## 2018-12-10 PROCEDURE — 99999 PR PBB SHADOW E&M-EST. PATIENT-LVL II: CPT | Mod: PBBFAC,,, | Performed by: OPTOMETRIST

## 2018-12-10 PROCEDURE — 99212 OFFICE O/P EST SF 10 MIN: CPT | Mod: PBBFAC,PO | Performed by: OPTOMETRIST

## 2018-12-10 NOTE — LETTER
December 10, 2018      [facility]  [address]  [place] [address]  Phone: [phone]  Fax: [phone]       Patient: Nelly Short   YOB: 1985  Date of Visit: 12/10/2018    To Whom It May Concern:    Samra Short  was at [facility] on 12/10/2018. He may return to work/school on 12/11/18 with no restrictions. If you have any questions or concerns, or if I can be of further assistance, please do not hesitate to contact me.    Sincerely,    Dr. VIRY Hwang

## 2018-12-10 NOTE — PROGRESS NOTES
Subjective:       Patient ID: Nelly Short is a 33 y.o. male      Chief Complaint   Patient presents with    Eye Problem     History of Present Illness  Dls: ? Yrs     32 y/o male presents today with c/o x 4 -5 days floaters od no flashes no pain no ha's. Pt denies trauma.       Eye meds  None     Assessment/Plan:     1. Vitreous floaters, right  Pt c/o recent onset of floaters OD x 4-5 days, states seeing spots flutter through OD. No flashes, no pain, denies trauma. DFE flat and intact 360. Pt concerned about floaters. Will schedule HVF 24-2 and OCT macula. Will call pt with results. If pt has any changes in symptoms and/or vision, advised to [facility] ASAP.   - Kaufman Visual Field - OU - Extended - Both Eyes; Future  - Posterior Segment OCT Retina-Both eyes; Future    Follow-up for HVF 24-2, OCT macula.   Will call with results.

## 2018-12-21 ENCOUNTER — CLINICAL SUPPORT (OUTPATIENT)
Dept: OPHTHALMOLOGY | Facility: CLINIC | Age: 33
End: 2018-12-21
Payer: MEDICAID

## 2018-12-21 DIAGNOSIS — H43.391 VITREOUS FLOATERS, RIGHT: ICD-10-CM

## 2018-12-21 PROCEDURE — 92134 CPTRZ OPH DX IMG PST SGM RTA: CPT | Mod: PBBFAC,PO

## 2018-12-21 PROCEDURE — 92083 EXTENDED VISUAL FIELD XM: CPT | Mod: PBBFAC,PO

## 2018-12-21 PROCEDURE — 92134 CPTRZ OPH DX IMG PST SGM RTA: CPT | Mod: 26,S$PBB,, | Performed by: OPTOMETRIST

## 2018-12-21 PROCEDURE — 92083 EXTENDED VISUAL FIELD XM: CPT | Mod: 26,S$PBB,, | Performed by: OPTOMETRIST

## 2019-01-04 ENCOUNTER — INITIAL CONSULT (OUTPATIENT)
Dept: OPHTHALMOLOGY | Facility: CLINIC | Age: 34
End: 2019-01-04
Attending: OPHTHALMOLOGY
Payer: MEDICAID

## 2019-01-04 DIAGNOSIS — H43.391 VITREOUS SYNERESIS, RIGHT: Primary | ICD-10-CM

## 2019-01-04 DIAGNOSIS — H31.001 CHORIORETINAL SCAR, RIGHT: ICD-10-CM

## 2019-01-04 DIAGNOSIS — H53.19 PHOTOPSIA OF RIGHT EYE: ICD-10-CM

## 2019-01-04 PROCEDURE — 92014 COMPRE OPH EXAM EST PT 1/>: CPT | Mod: S$PBB,,, | Performed by: OPHTHALMOLOGY

## 2019-01-04 PROCEDURE — 99213 OFFICE O/P EST LOW 20 MIN: CPT | Mod: PBBFAC,25 | Performed by: OPHTHALMOLOGY

## 2019-01-04 PROCEDURE — 92014 PR EYE EXAM, EST PATIENT,COMPREHESV: ICD-10-PCS | Mod: S$PBB,,, | Performed by: OPHTHALMOLOGY

## 2019-01-04 PROCEDURE — 92225 PR SPECIAL EYE EXAM, INITIAL: CPT | Mod: PBBFAC,RT | Performed by: OPHTHALMOLOGY

## 2019-01-04 PROCEDURE — 92225 PR SPECIAL EYE EXAM, INITIAL: CPT | Mod: S$PBB,RT,, | Performed by: OPHTHALMOLOGY

## 2019-01-04 PROCEDURE — 99999 PR PBB SHADOW E&M-EST. PATIENT-LVL III: ICD-10-PCS | Mod: PBBFAC,,, | Performed by: OPHTHALMOLOGY

## 2019-01-04 PROCEDURE — 92225 PR SPECIAL EYE EXAM, INITIAL: ICD-10-PCS | Mod: S$PBB,RT,, | Performed by: OPHTHALMOLOGY

## 2019-01-04 PROCEDURE — 99999 PR PBB SHADOW E&M-EST. PATIENT-LVL III: CPT | Mod: PBBFAC,,, | Performed by: OPHTHALMOLOGY

## 2019-01-04 NOTE — LETTER
January 4, 2019      Natasha Hwang, OD  [address]  [facility] [id number]           [facility]  [address]  [facility] [address]  Phone: [phone]  Fax: [phone]          Patient: Nelly Short   MR Number: [id number]   YOB: 1985   Date of Visit: 1/4/2019       Dear Dr. Natasha Hwang:    Thank you for referring Nelly Short to me for evaluation. Attached you will find relevant portions of my assessment and plan of care.    If you have questions, please do not hesitate to call me. I look forward to following Nelly Short along with you.    Sincerely,    Phil Mchugh MD    Enclosure  CC:  No Recipients    If you would like to receive this communication electronically, please contact [email or web address] or [phone] to request more information on [facility] access.    For providers and/or their staff who would like to refer a patient to [facility], please contact us through our one-stop-shop provider referral line, [facility], at [phone].    If you feel you have received this communication in error or would no longer like to receive these types of communications, please e-mail [email or web address]

## 2019-01-04 NOTE — PROGRESS NOTES
HPI     Referred by Dr Hwang who seen pt on 12/10/18 for floaters     Pt noted about 2 weeks ago started with bright floaters in the right eye   that would move across vision and fade out.  These have resolved.  Now   getting bright cicular flash right side OD, rapid flash/split second.    Happens 4 or 5 times and goes away.  Mostly sees in AM.  No f/f OS.  Va   and VF otherwise nl OU.    Did not see flashes yesterday or today    Otherwise feels fine.  FH: Mother had retina surgery- thinks RD  POH: none    Eye meds  None        Last edited by Phil Mchugh MD on 1/4/2019  9:50 AM. (History)        OCT WNL OU.  Vit attached OU    Assessment /Plan     For exam results, see Encounter Report.    Vitreous syneresis, right    Chorioretinal scar, right    Photopsia of right eye      OD with flashes in last two weeks but none in last two days  No breaks, no PVD  Likely VR interface symptom  Pt to observe Va and symtpoms  RTC 3-4 wks for reeval.  If persisting may investigate for rarer causes of photopsia    Pathology of PVD, Retinal Tear, Retinal Detachment reviewed in great detail  RD precautions discussed in detail, patient expressed understanding  RTC 3-4 wks, sooner PRN (especially ANY change flashes, floaters, vision, visual field)

## 2019-08-07 ENCOUNTER — OFFICE VISIT (OUTPATIENT)
Dept: FAMILY MEDICINE | Facility: HOSPITAL | Age: 34
End: 2019-08-07
Payer: MEDICAID

## 2019-08-07 VITALS
TEMPERATURE: 98 F | BODY MASS INDEX: 22.09 KG/M2 | SYSTOLIC BLOOD PRESSURE: 106 MMHG | HEART RATE: 76 BPM | HEIGHT: 72 IN | WEIGHT: 163.13 LBS | DIASTOLIC BLOOD PRESSURE: 64 MMHG

## 2019-08-07 DIAGNOSIS — K64.4 EXTERNAL HEMORRHOIDS: ICD-10-CM

## 2019-08-07 DIAGNOSIS — N45.1 EPIDIDYMITIS: Primary | ICD-10-CM

## 2019-08-07 PROCEDURE — 99214 OFFICE O/P EST MOD 30 MIN: CPT | Performed by: STUDENT IN AN ORGANIZED HEALTH CARE EDUCATION/TRAINING PROGRAM

## 2019-08-07 RX ORDER — MELOXICAM 15 MG/1
TABLET ORAL
Refills: 1 | COMMUNITY
Start: 2019-06-26 | End: 2019-08-07

## 2019-08-07 RX ORDER — CYCLOBENZAPRINE HCL 5 MG
TABLET ORAL
Refills: 0 | COMMUNITY
Start: 2019-06-26 | End: 2019-08-07

## 2019-08-07 NOTE — PROGRESS NOTES
"Subjective:       Patient ID: Nelly Short is a 34 y.o. male.    Chief Complaint: lesion on testicle     Nelly Short is a 34 y.o. Male who presents for complaint of "lesion" on testicles for about a year. He states the lesion is start to becoming irritating.  He denies any exacerbating or alleviating factors. He is sexually active with female partner without condom usage.  he is in a monogamous relationship. Last tested for HIV 03/2018. He denies penile discharge, dysuria and difficulty urinating.     Review of Systems   Constitutional: Negative for activity change, chills, fatigue and fever.   HENT: Negative for congestion and ear pain.    Eyes: Negative for pain.   Respiratory: Negative for apnea, chest tightness, shortness of breath and wheezing.    Cardiovascular: Negative for chest pain and palpitations.   Gastrointestinal: Negative for abdominal distention, abdominal pain, constipation, diarrhea, nausea and rectal pain.   Endocrine: Negative for polydipsia.   Genitourinary: Positive for genital sores. Negative for difficulty urinating, discharge, dysuria, flank pain, hematuria, penile pain, penile swelling, scrotal swelling, testicular pain and urgency.   Musculoskeletal: Negative for arthralgias, back pain, neck pain and neck stiffness.   Skin: Positive for rash (testicles).   Neurological: Negative for dizziness, tremors, facial asymmetry, speech difficulty and light-headedness.   Psychiatric/Behavioral: Negative for agitation.       Objective:      Vitals:    08/07/19 1324   BP: 106/64   Pulse: 76   Temp: 98.2 °F (36.8 °C)     Physical Exam   Constitutional: He appears well-developed and well-nourished. He is cooperative.   HENT:   Head: Normocephalic and atraumatic.   Right Ear: External ear normal.   Left Ear: External ear normal.   Nose: Nose normal.   Mouth/Throat: Oropharynx is clear and moist.   Eyes: Conjunctivae and EOM are normal.   Neck: Normal range of motion. Neck supple. "   Cardiovascular: Normal rate, regular rhythm, normal heart sounds and intact distal pulses. Exam reveals no gallop and no friction rub.   No murmur heard.  Pulmonary/Chest: Effort normal and breath sounds normal.   Abdominal: Soft. Bowel sounds are normal. He exhibits no distension. There is no tenderness. There is no guarding.   Genitourinary: Testes normal and penis normal. Cremasteric reflex is present. Right testis shows no mass and no swelling. Right testis is descended. Cremasteric reflex is not absent on the right side. Left testis shows no mass and no swelling. Left testis is descended. Cremasteric reflex is not absent on the left side. Circumcised. No hypospadias, penile erythema or penile tenderness.         Musculoskeletal: Normal range of motion. He exhibits no edema.   Neurological: He is alert.   Skin: Skin is warm and dry.   Psychiatric: He has a normal mood and affect.   Nursing note and vitals reviewed.      Assessment:       1. Epididymitis    2. External hemorrhoids        Plan:       Epididymitis  -     US Scrotum And Testicles; Future; Expected date: 08/07/2019  -     HIV 1/2 Ag/Ab (4th Gen); Future; Expected date: 08/07/2019  -     RPR; Future; Expected date: 08/07/2019  -     C. trachomatis/N. gonorrhoeae by AMP DNA; Future; Expected date: 08/07/2019    External hemorrhoids  -     Ambulatory referral to General Surgery      Follow up in about 4 weeks (around 9/4/2019).      D'Amico C Johnson, MD  8/7/2019  2:00 PM  [facility]   House Officer -II

## 2019-08-07 NOTE — PATIENT INSTRUCTIONS
What are Epididymitis and Orchitis?    The epididymis is a coiled tube. It is part of the male reproductive system. A mans two testicles sit inside the scrotum. One epididymis sits behind each testicle. Epididymitis is inflammation (swelling and irritation) of this tube. Orchitis occurs when the inflammation spreads to the testicle.  Normal flow of sperm and urine  Sperm are made in the testicles. Sperm travel from the testicles through the epididymis. They flow into a tube called the vas deferens. During ejaculation, sperm pass from the vas deferens through the urethra out of the body. During urination, urine flows from the bladder through the urethra out of the body.  How the problem starts  The problem is thought to be due to bacteria. If bacteria get into the urethra, they may cause an infection. Infection can then travel up the urethra into the epididymis. This leads to inflammation. Pain and swelling can then spread to the testicle. This is called orchitis. In rare cases, orchitis can be due to an infection with the mumps virus. There are two kinds of inflammation:  · Acute inflammation comes on quickly. Symptoms may include:  ¨ Pain and swelling in the scrotum  ¨ Frequent urge to urinate  ¨ Discharge from the penis  ¨ Pain during ejaculation  ¨ Fever  · Chronic inflammation is most often the late phase of an acute infection. Symptoms may include:  ¨ An ache or dull pain in the scrotum, which may spread to the groin  ¨ A heavy feeling in the scrotum  Date Last Reviewed: 1/1/2017  © 4695-5274 [employer]. [address] [place] [address], [place] [address]. All rights reserved. This information is not intended as a substitute for professional medical care. Always follow your healthcare professional's instructions.

## 2019-08-14 ENCOUNTER — HOSPITAL ENCOUNTER (OUTPATIENT)
Dept: RADIOLOGY | Facility: HOSPITAL | Age: 34
Discharge: HOME | End: 2019-08-14
Attending: STUDENT IN AN ORGANIZED HEALTH CARE EDUCATION/TRAINING PROGRAM
Payer: MEDICAID

## 2019-08-14 DIAGNOSIS — N45.1 EPIDIDYMITIS: ICD-10-CM

## 2019-08-14 PROCEDURE — 76870 US SCROTUM AND TESTICLES: ICD-10-PCS | Mod: 26,,, | Performed by: RADIOLOGY

## 2019-08-14 PROCEDURE — 76870 US EXAM SCROTUM: CPT | Mod: TC

## 2019-08-14 PROCEDURE — 76870 US EXAM SCROTUM: CPT | Mod: 26,,, | Performed by: RADIOLOGY

## 2019-09-03 PROBLEM — K59.01 SLOW TRANSIT CONSTIPATION: Status: ACTIVE | Noted: 2019-09-03

## 2019-09-03 PROBLEM — K64.1 GRADE II HEMORRHOIDS: Status: ACTIVE | Noted: 2019-09-03

## 2019-10-15 ENCOUNTER — HOSPITAL ENCOUNTER (EMERGENCY)
Facility: HOSPITAL | Age: 34
Discharge: HOME | End: 2019-10-15
Attending: EMERGENCY MEDICINE
Payer: MEDICAID

## 2019-10-15 VITALS
WEIGHT: 165 LBS | HEART RATE: 73 BPM | HEIGHT: 72 IN | DIASTOLIC BLOOD PRESSURE: 69 MMHG | SYSTOLIC BLOOD PRESSURE: 134 MMHG | RESPIRATION RATE: 18 BRPM | OXYGEN SATURATION: 100 % | BODY MASS INDEX: 22.35 KG/M2 | TEMPERATURE: 99 F

## 2019-10-15 DIAGNOSIS — R09.81 NASAL CONGESTION: ICD-10-CM

## 2019-10-15 DIAGNOSIS — J02.9 PHARYNGITIS, UNSPECIFIED ETIOLOGY: ICD-10-CM

## 2019-10-15 DIAGNOSIS — R09.82 POST-NASAL DRIP: ICD-10-CM

## 2019-10-15 DIAGNOSIS — J06.9 VIRAL URI WITH COUGH: Primary | ICD-10-CM

## 2019-10-15 PROCEDURE — 99284 EMERGENCY DEPT VISIT MOD MDM: CPT

## 2019-10-15 PROCEDURE — 25000003 PHARM REV CODE 250: Performed by: NURSE PRACTITIONER

## 2019-10-15 RX ORDER — FLUTICASONE PROPIONATE 50 MCG
1 SPRAY, SUSPENSION (ML) NASAL 2 TIMES DAILY PRN
Qty: 15 G | Refills: 0 | Status: SHIPPED | OUTPATIENT
Start: 2019-10-15

## 2019-10-15 RX ORDER — IBUPROFEN 600 MG/1
600 TABLET ORAL
Status: COMPLETED | OUTPATIENT
Start: 2019-10-15 | End: 2019-10-15

## 2019-10-15 RX ORDER — LORATADINE 10 MG/1
10 TABLET ORAL DAILY
Qty: 14 TABLET | Refills: 0 | Status: SHIPPED | OUTPATIENT
Start: 2019-10-15 | End: 2019-10-29

## 2019-10-15 RX ADMIN — LIDOCAINE HYDROCHLORIDE 15 ML: 20 SOLUTION ORAL; TOPICAL at 09:10

## 2019-10-15 RX ADMIN — IBUPROFEN 600 MG: 600 TABLET, FILM COATED ORAL at 09:10

## 2019-10-16 NOTE — ED PROVIDER NOTES
"Encounter Date: 10/15/2019       History     Chief Complaint   Patient presents with    Cough     "I been having sinus problems" pat few days.    Nasal Congestion     CC: Cough, Congestion, Sore Throat    HPI: Nelly Short, a 34 y.o. male presents to the ED with a 3 to four-day history of cough, runny nose with nasal congestion, and sore throat. Reports symptoms in constant and gradually worsening.  He reports some facial pain/headache as well. No medications or treatment attempted prior to arrival.  He reports no known sick contacts.  He reports no fevers.  He works as a .      The history is provided by the patient. No  was used.     Review of patient's allergies indicates:   Allergen Reactions    Crayfish Nausea And Vomiting     History reviewed. No pertinent past medical history.  Past Surgical History:   Procedure Laterality Date    LEG SURGERY       Family History   Problem Relation Age of Onset    No Known Problems Mother     No Known Problems Father     No Known Problems Sister     No Known Problems Brother     No Known Problems Maternal Aunt     No Known Problems Maternal Uncle     No Known Problems Paternal Aunt     No Known Problems Paternal Uncle     No Known Problems Maternal Grandmother     No Known Problems Maternal Grandfather     No Known Problems Paternal Grandmother     No Known Problems Paternal Grandfather     Amblyopia Neg Hx     Blindness Neg Hx     Cancer Neg Hx     Cataracts Neg Hx     Diabetes Neg Hx     Glaucoma Neg Hx     Hypertension Neg Hx     Macular degeneration Neg Hx     Retinal detachment Neg Hx     Strabismus Neg Hx     Stroke Neg Hx     Thyroid disease Neg Hx      Social History     Tobacco Use    Smoking status: Current Some Day Smoker    Smokeless tobacco: Never Used   Substance Use Topics    Alcohol use: Yes     Comment: occ    Drug use: Not on file     Review of Systems   Constitutional: Negative for chills " and fever.   HENT: Positive for congestion, rhinorrhea, sinus pain and sore throat. Negative for trouble swallowing.    Respiratory: Positive for cough. Negative for shortness of breath.    Gastrointestinal: Negative for vomiting.   Musculoskeletal: Negative for back pain and neck pain.   Skin: Negative for rash and wound.   Neurological: Positive for headaches. Negative for syncope.   Psychiatric/Behavioral: Negative for confusion.       Physical Exam     Initial Vitals [10/15/19 1954]   BP Pulse Resp Temp SpO2   134/69 73 18 98.6 °F (37 °C) 100 %      MAP       --         Physical Exam    Nursing note and vitals reviewed.  Constitutional: He appears well-developed and well-nourished. He is not diaphoretic. He is cooperative.  Non-toxic appearance. No distress.   HENT:   Head: Normocephalic and atraumatic.   Right Ear: Tympanic membrane and external ear normal. Tympanic membrane is not erythematous.   Left Ear: Tympanic membrane and external ear normal. Tympanic membrane is not erythematous.   Nose: Mucosal edema and rhinorrhea present. Right sinus exhibits maxillary sinus tenderness. Right sinus exhibits no frontal sinus tenderness. Left sinus exhibits maxillary sinus tenderness. Left sinus exhibits no frontal sinus tenderness.   Mouth/Throat: Uvula is midline, oropharynx is clear and moist and mucous membranes are normal. No trismus in the jaw.   Pale, boggy nasal mucosa.  Posterior pharyngeal cobblestoning.  Rhinorrhea. Mild bilateral maxillary sinus tenderness. No frontal sinus tenderness.   Eyes: Conjunctivae and EOM are normal.   Neck: Trachea normal, normal range of motion and phonation normal. Neck supple. Normal range of motion present. No neck rigidity.   Cardiovascular: Normal rate and regular rhythm.   Pulmonary/Chest: No tachypnea and no bradypnea. No respiratory distress. He has no wheezes. He has no rhonchi. He has no rales.   Musculoskeletal: Normal range of motion.   Lymphadenopathy:        Right  cervical: No superficial cervical adenopathy present.       Left cervical: No superficial cervical adenopathy present.   Neurological: He is alert and oriented to person, place, and time. No sensory deficit. Coordination and gait normal. GCS score is 15. GCS eye subscore is 4. GCS verbal subscore is 5. GCS motor subscore is 6.   Skin: Skin is warm, dry and intact. No bruising and no rash noted. No erythema.   Psychiatric: He has a normal mood and affect. His behavior is normal. Judgment and thought content normal.         ED Course   Procedures  Labs Reviewed   THROAT SCREEN, RAPID          Imaging Results    None                APC / Resident Notes:   This is an evaluation of a 34 y.o. male that presents to the Emergency Department for Runny Nose, Nasal Congestion and Cough. The patient is a non-toxic, afebrile, and well appearing male. On physical exam ears and pharynx are without evidence of infection. Appears well hydrated with moist mucus membranes. Neck soft and supple with no meningeal signs or cervical lymphadenopathy. Breath sounds are clear and equal bilaterally with no adventitious breath sounds, tachypnea or respiratory distress with room air pulse ox of 100% and no evidence of hypoxia. Vital Signs Are Reassuring.  Patient is requesting antibiotics.  I offered strep testing to rule out bacterial cause of sore throat. Patient initially agreed, then declined.  Explained to him that at this point antibiotics not indicated in this is likely viral illness.  Encourage follow-up and return precautions.  Low suspicion for strep pharyngitis based on 0 Centor Criteria.    My overall impression is Viral URI with cough and nasal congestion. I considered, but at this time, do not suspect OM, OE, strep pharyngitis, meningitis, pneumonia, or acute bacterial sinusitis.  I suspect sore throat is likely related to postnasal drip.    D/C Information:  Hydration, Tylenol/ibuprofen.. The diagnosis, treatment plan,  instructions for follow-up and reevaluation with PCP as well as ED return precautions were discussed and understanding was verbalized. All questions or concerns have been addressed.  At signature              ED Course as of Oct 15 2127   Tue Oct 15, 2019   2124 Nursing Staff reports patient refused the strep swab.    [AF]      ED Course User Index  [AF] SUBHASH Live     Clinical Impression:       ICD-10-CM ICD-9-CM   1. Viral URI with cough J06.9 465.9    B97.89    2. Nasal congestion R09.81 478.19   3. Post-nasal drip R09.82 784.91   4. Pharyngitis, unspecified etiology J02.9 462         Disposition:   Disposition: Discharged  Condition: Stable                        SUBHASH Live  10/15/19 2127

## 2019-10-16 NOTE — DISCHARGE INSTRUCTIONS
Please return to the Emergency Department for any new or worsening symptoms including: worsening pain, difficulty breathing or swallowing, fever, chest pain, shortness of breath, loss of consciousness, dizziness, weakness, or any other concerns.     Please schedule an appointment for follow up with your Primary Care Doctor as soon as possible for a recheck of your symptoms. If you do not have one, you may contact the one listed on your discharge paperwork or you may also call the [facility] Appointment Desk at [phone] to schedule an appointment with one.     Please take all medication as prescribed. Tylenol or ibuprofen as needed for pain.

## 2019-10-16 NOTE — ED NOTES
No past medical history on file.  Pt presenting with sore throat, cough, runny nose,congestion, headache, sinus problem, sneezing, earaches (bilateral), reports that symptoms started 4 - 5 days ago.  No medications taken, denies vomiting , fever or diarrhea.

## 2020-11-05 ENCOUNTER — OFFICE VISIT (OUTPATIENT)
Dept: FAMILY MEDICINE | Facility: HOSPITAL | Age: 35
End: 2020-11-05
Payer: MEDICAID

## 2020-11-05 VITALS
WEIGHT: 169.75 LBS | HEART RATE: 77 BPM | BODY MASS INDEX: 22.99 KG/M2 | DIASTOLIC BLOOD PRESSURE: 75 MMHG | HEIGHT: 72 IN | SYSTOLIC BLOOD PRESSURE: 119 MMHG

## 2020-11-05 DIAGNOSIS — M62.830 SPASM OF MUSCLE OF LOWER BACK: Primary | ICD-10-CM

## 2020-11-05 PROCEDURE — 96372 THER/PROPH/DIAG INJ SC/IM: CPT

## 2020-11-05 PROCEDURE — 99213 OFFICE O/P EST LOW 20 MIN: CPT | Mod: 25 | Performed by: STUDENT IN AN ORGANIZED HEALTH CARE EDUCATION/TRAINING PROGRAM

## 2020-11-05 RX ORDER — KETOROLAC TROMETHAMINE 30 MG/ML
60 INJECTION, SOLUTION INTRAMUSCULAR; INTRAVENOUS
Status: COMPLETED | OUTPATIENT
Start: 2020-11-05 | End: 2020-11-05

## 2020-11-05 RX ORDER — CYCLOBENZAPRINE HCL 10 MG
10 TABLET ORAL NIGHTLY
Qty: 10 TABLET | Refills: 0 | Status: SHIPPED | OUTPATIENT
Start: 2020-11-05 | End: 2020-11-15

## 2020-11-05 RX ORDER — MELOXICAM 15 MG/1
15 TABLET ORAL DAILY
Qty: 10 TABLET | Refills: 0 | Status: SHIPPED | OUTPATIENT
Start: 2020-11-05 | End: 2020-11-15

## 2020-11-05 RX ADMIN — KETOROLAC TROMETHAMINE 60 MG: 30 INJECTION, SOLUTION INTRAMUSCULAR at 11:11

## 2020-11-05 NOTE — PROGRESS NOTES
"Subjective:       Patient ID: Nelly Short is a 35 y.o. male.    Chief Complaint: Back Pain (Right lower ) and Spasms (Right leg)    Nelly Short is a 35 y.o. male with no significant medical history who presents for evaluation of lower back pain and spasm x 1 week. The patient recently started working at [employer] in the [employer] department. His work includes a lot of heavy lifting and is very fast paced. He states he woke up earlier this week and couldn't move from the bed. He states that he felt "stiff". He went to an urgent care where he was given a steroid shot that lasted for 2 days then the pain returned. The pain is located lower back 8/10 and does not radiate down the leg. He denies any changes in bowel or bladder function.     Review of Systems   Constitutional: Negative for activity change, chills, fatigue and fever.   HENT: Negative for congestion and ear pain.    Eyes: Negative for pain.   Respiratory: Negative for apnea, chest tightness, shortness of breath and wheezing.    Cardiovascular: Negative for chest pain and palpitations.   Gastrointestinal: Negative for abdominal distention, abdominal pain, constipation, diarrhea, nausea and rectal pain.   Endocrine: Negative for polydipsia.   Genitourinary: Negative for flank pain.   Musculoskeletal: Positive for back pain and myalgias. Negative for arthralgias, neck pain and neck stiffness.   Skin: Negative for rash.   Neurological: Negative for dizziness, tremors, facial asymmetry, speech difficulty and light-headedness.   Psychiatric/Behavioral: Negative for agitation.       Objective:      Vitals:    11/05/20 1046   BP: 119/75   Pulse: 77     Physical Exam  Vitals signs and nursing note reviewed.   Constitutional:       General: He is awake.      Appearance: Normal appearance. He is well-developed and well-groomed.   HENT:      Head: Normocephalic and atraumatic.      Right Ear: External ear normal.      Left Ear: External ear normal.      " Nose: Nose normal.      Mouth/Throat:      Mouth: Mucous membranes are moist.   Eyes:      Conjunctiva/sclera: Conjunctivae normal.      Pupils: Pupils are equal, round, and reactive to light.   Neck:      Musculoskeletal: Normal range of motion and neck supple.   Cardiovascular:      Rate and Rhythm: Normal rate and regular rhythm.      Pulses: Normal pulses.      Heart sounds: Normal heart sounds. No murmur. No friction rub. No gallop.    Pulmonary:      Effort: Pulmonary effort is normal.      Breath sounds: Normal breath sounds.   Abdominal:      General: Abdomen is flat. Bowel sounds are normal. There is no distension.      Palpations: Abdomen is soft.      Tenderness: There is no abdominal tenderness. There is no guarding.   Musculoskeletal:      Lumbar back: He exhibits decreased range of motion, tenderness and spasm.      Comments: Pain only when bending over, tenderness along the paraspinal muscles    Skin:     General: Skin is warm and dry.   Neurological:      Mental Status: He is alert.   Psychiatric:         Behavior: Behavior is cooperative.         Assessment:       1. Spasm of muscle of lower back        Plan:       Spasm of muscle of lower back  -     cyclobenzaprine (FLEXERIL) 10 MG tablet; Take 1 tablet (10 mg total) by mouth every evening. for 10 days  Dispense: 10 tablet; Refill: 0  -     meloxicam (MOBIC) 15 MG tablet; Take 1 tablet (15 mg total) by mouth once daily. for 10 days  Dispense: 10 tablet; Refill: 0  -     ketorolac injection 60 mg      Follow up if symptoms worsen or fail to improve.      D'Amico C Johnson, MD  11/5/2020  12:45 PM  [facility]   House Officer -III

## 2020-11-05 NOTE — PATIENT INSTRUCTIONS
Back Spasm (No Trauma)    Spasm of the back muscles can occur after a sudden forceful twisting or bending force (such as in a car accident), after a simple awkward movement, or after lifting something heavy with poor body positioning. In any case, muscle spasm adds to the pain. Sleeping in an awkward position or on a poor quality mattress can also cause this. Some people respond to emotional stress by tensing the muscles of their back.  Pain that continues may need further evaluation or other types of treatment such as physical therapy.  You don't always need X-rays for the initial evaluation of back pain, unless you had a physical injury such as from a car accident or fall. If your pain continues and doesn't respond to medical treatment, X-rays and other tests may then be done.   Home care  · As soon as possible, start sitting or walking again to avoid problems from prolonged bed rest (muscle weakness, worsening back stiffness and pain, blood clots in the legs).  · When in bed, try to find a position of comfort. A firm mattress is best. Try lying flat on your back with pillows under your knees. You can also try lying on your side with your knees bent up toward your chest and a pillow between your knees.  · Avoid prolonged sitting, long car rides, or travel. This puts more stress on the lower back than standing or walking.   · During the first 24 to 72 hours after an injury or flare-up, apply an ice pack to the painful area for 20 minutes, then remove it for 20 minutes. Do this over a period of 60 to 90 minutes or several times a day. This will reduce swelling and pain. Always wrap ice packs in a thin towel.  · You can start with ice, then switch to heat. Heat (hot shower, hot bath, or heating pad) reduces pain, and works well for muscle spasms. Apply heat to the painful area for 20 minutes, then remove it for 20 minutes. Do this over a period of 60 to 90 minutes or several times a day. Do not sleep on a heating  pad as it can burn or damage skin.  · Alternate ice and heat therapies.  · Be aware of safe lifting methods and do not lift anything over 15 pounds until all the pain is gone.  Gentle stretching will help your back heal faster. Do this simple routine 2 to 3 times a day until your back is feeling better.  · Lie on your back with your knees bent and both feet on the ground  · Slowly raise your left knee to your chest as you flatten your lower back against the floor. Hold for 20 to 30 seconds.  · Relax and repeat the exercise with your right knee.  · Do 2 to 3 of these exercises for each leg.  · Repeat, hugging both knees to your chest at the same time.  · Do not bounce, but use a gentle pull.  Medicines  Talk to your doctor before using medicine, especially if you have other medical problems or are taking other medicines.  You may use acetaminophen or ibuprofen to control pain, unless your healthcare provider prescribed another pain medicine. If you have a chronic condition such as diabetes, liver or kidney disease, stomach ulcer, or gastrointestinal bleeding, or are taking blood thinners, talk with your healthcare provider before taking any medicines.  Be careful if you are given prescription pain medicine, narcotics, or medicine for muscle spasm. They can cause drowsiness, affect your coordination, reflexes, or judgment. Do not drive or operate heavy machinery when taking these medicines. Take pain medicine only as prescribed by your healthcare provider.  Follow-up care  Follow up with your doctor, or as advised. Physical therapy or further tests may be needed.  If X-rays were taken, they may be reviewed by a radiologist. You will be notified of any new findings that may affect your care.  Call 911  Seek emergency medical care if any of these occur:  · Trouble breathing  · Confusion  · Drowsiness or trouble awakening  · Fainting or loss of consciousness  · Rapid or very slow heart rate  · Loss of bowel or bladder  control  When to seek medical advice  Call your healthcare provider right away if any of these occur:  · Pain becomes worse or spreads to your legs  · Weakness or numbness in one or both legs  · Numbness in the groin or genital area  · Unexplained fever over 100.4ºF (38.0ºC)  · Burning or pain when passing urine  Date Last Reviewed: 6/1/2016  © 7979-7950 [employer]. [address] [place] [address]. All rights reserved. This information is not intended as a substitute for professional medical care. Always follow your healthcare professional's instructions.        Relieving Tension in Your Back  Being relaxed helps keep your mind healthy and your back ready to move. Take short breaks often. Walk around. Stretch. Switch tasks. Also give the following a try.  Make time to relax. Start by setting aside 5 minutes daily.   Deep breathing    Deep breathing is a simple way to reduce stress. You can do it almost any time you need to relax.  · Inhale slowly through your nose. Let your lungs and stomach expand.  · Hold your breath for 2 to 3 seconds.  · Exhale slowly through your mouth until your lungs feel empty. Repeat 3 to 4 times.  Relieve tension  Muscle tension can create tender spots called trigger points. The tips below may help relieve muscle tension.  · Press the trigger point if you can reach it. If not, lie on a soft tennis ball, or ask a friend to press the spot. Use steady pressure for 10 to 15 seconds. Breathe deeply. Repeat a few times.  · Massage trigger points with ice for 2 to 5 minutes. Press lightly at first. Slowly increase firmness.  Date Last Reviewed: 10/18/2015  © 5406-7035 [employer]. [address] [place] [address]. All rights reserved. This information is not intended as a substitute for professional medical care. Always follow your healthcare professional's instructions.        Back Safety: Lifting  Lifting can strain or even injure your back.  Follow these tips to keep your back safe while you bend, lift, and carry.      Protect Your Back While Lifting       Step 1:  · Face the object.  · With your back straight, get down on one knee.  · If you can, tilt the object so one side lifts off the ground.  · Keep the object close to you. Step 2:  · Tighten your stomach muscles.  · Use your legs, arms, and buttocks to lift, not your back.  · Avoid twisting.  · Lift the object to your knee.  · Grasp the object firmly. Step 3:  · Lift with your arms and legs, not your back.  · Move quickly to help make this easier.   To carry an object:  · Hold it close to your body.  · Bend your knees slightly as you walk. The heavier the object, the more you should bend your knees.  · Get help with heavy or unbalanced objects.  Date Last Reviewed: 8/31/2015 © 2000-2017 [employer]. [address] [place] [address]. All rights reserved. This information is not intended as a substitute for professional medical care. Always follow your healthcare professional's instructions.        Back Safety: Lifting  Lifting can strain or even injure your back. Follow these tips to keep your back safe while you bend, lift, and carry.      Protect Your Back While Lifting       Step 1:  · Face the object.  · With your back straight, get down on one knee.  · If you can, tilt the object so one side lifts off the ground.  · Keep the object close to you. Step 2:  · Tighten your stomach muscles.  · Use your legs, arms, and buttocks to lift, not your back.  · Avoid twisting.  · Lift the object to your knee.  · Grasp the object firmly. Step 3:  · Lift with your arms and legs, not your back.  · Move quickly to help make this easier.   To carry an object:  · Hold it close to your body.  · Bend your knees slightly as you walk. The heavier the object, the more you should bend your knees.  · Get help with heavy or unbalanced objects.  Date Last Reviewed: 8/31/2015 © 2000-2017 [employer]. [address] [place] [address], [place] [address]. All rights reserved. This information is not intended as a substitute for professional medical care. Always follow your healthcare professional's instructions.

## 2021-03-03 ENCOUNTER — LAB VISIT (OUTPATIENT)
Dept: LAB | Facility: HOSPITAL | Age: 36
End: 2021-03-03
Attending: STUDENT IN AN ORGANIZED HEALTH CARE EDUCATION/TRAINING PROGRAM
Payer: MEDICAID

## 2021-03-03 DIAGNOSIS — N45.1 EPIDIDYMITIS: ICD-10-CM

## 2021-03-03 PROCEDURE — 86703 HIV-1/HIV-2 1 RESULT ANTBDY: CPT | Performed by: STUDENT IN AN ORGANIZED HEALTH CARE EDUCATION/TRAINING PROGRAM

## 2021-03-03 PROCEDURE — 86592 SYPHILIS TEST NON-TREP QUAL: CPT | Performed by: STUDENT IN AN ORGANIZED HEALTH CARE EDUCATION/TRAINING PROGRAM

## 2021-03-03 PROCEDURE — 36415 COLL VENOUS BLD VENIPUNCTURE: CPT | Performed by: STUDENT IN AN ORGANIZED HEALTH CARE EDUCATION/TRAINING PROGRAM

## 2021-03-04 LAB
HIV 1+2 AB+HIV1 P24 AG SERPL QL IA: NEGATIVE
RPR SER QL: NORMAL

## 2021-03-25 RX ORDER — MELOXICAM 15 MG/1
15 TABLET ORAL DAILY
Status: CANCELLED | OUTPATIENT
Start: 2021-03-25

## 2021-03-25 RX ORDER — MELOXICAM 15 MG/1
15 TABLET ORAL DAILY
COMMUNITY
Start: 2021-01-27

## 2021-04-16 ENCOUNTER — PATIENT MESSAGE (OUTPATIENT)
Dept: RESEARCH | Facility: HOSPITAL | Age: 36
End: 2021-04-16

## 2021-07-01 ENCOUNTER — PATIENT MESSAGE (OUTPATIENT)
Dept: ADMINISTRATIVE | Facility: OTHER | Age: 36
End: 2021-07-01